# Patient Record
(demographics unavailable — no encounter records)

---

## 2024-10-28 NOTE — HISTORY OF PRESENT ILLNESS
[FreeTextEntry1] : Follow Up [de-identified] : Patient presents today for a follow up. Feels well today  She follows with her urologist regularly for hx of bladder cancer s/p cystoscopy in April.  Follows with her cardiologist, Dr. Lopez, for hx of abnormal EKG, Has had stress test and TTE and per pt were normal. stress test 8/30/23 - normal. Follows with him every 6 months.  Has hx of DM. States her FBG is usually 140's. Follows with optho and podiatry. Has established care with endocrinology   States her BP at home is usually 120's/70's, but she states she gets nervous in the doctor's office.

## 2024-10-28 NOTE — PLAN
[FreeTextEntry1] : 1. DM - A1c 6.9 -->8 -->8.7 -->7.1 --> 8.8 - glyburide-metformin 2.5-500mg BID was dc'ed to avoid BG lows -- dc'ed 1/2/24 - c/w metformin 1000mg BID  - Jardiance 25mg was dc'ed 2/2 unintentional weight loss and pt was instead started on pioglitazone by endocrinology, but pt could not tolerate it 2/2 dizziness and instead was changed to repaglinide  - on statin and ACE-i - CMP ordered - c/w low carb diet - follows with ophtho and podiatry - Follows with endocrinology   2. HTN - chronic, stable. BP initially elevated 2/2 white coat hypertension - on amlodipine-benazepril - f/u CMP  4. HLD - on atorvastatin 20mg - repeat lipid panel when pt is fasting - Follows with her cardiologist, Dr. Lopez, for hx of abnormal EKG, Has had stress test and TTE and per pt were normal. stress test 8/30/23 - normal. Follows with him every 6 months.  5. Hx of Bladder Cancer - s/p TURBT in 2015 -- consistent with noninvasive papillary carcinoma, low grade - undergoes surveillance cystoscopies - Management per her urologist, Dr. Lucas.  6. Weight Loss - 2/2 Jardiance -- resolved after medication change   f/u for CPE

## 2025-01-06 NOTE — PHYSICAL EXAM
[Alert] : alert [Well Nourished] : well nourished [No Acute Distress] : no acute distress [EOMI] : extra ocular movement intact [No Proptosis] : no proptosis [Supple] : the neck was supple [Thyroid Not Enlarged] : the thyroid was not enlarged [No Thyroid Nodules] : no palpable thyroid nodules [No Respiratory Distress] : no respiratory distress [No Accessory Muscle Use] : no accessory muscle use [Normal Rate and Effort] : normal respiratory rate and effort [Normal Rate] : heart rate was normal [Regular Rhythm] : with a regular rhythm [No Edema] : no peripheral edema [Not Tender] : non-tender [Soft] : abdomen soft [No Spinal Tenderness] : no spinal tenderness [Spine Straight] : spine straight [Cranial Nerves Intact] : cranial nerves 2-12 were intact [No Motor Deficits] : the motor exam was normal [Oriented x3] : oriented to person, place, and time [Normal Affect] : the affect was normal [Normal Mood] : the mood was normal [Kyphosis] : no kyphosis present

## 2025-01-06 NOTE — HISTORY OF PRESENT ILLNESS
[FreeTextEntry1] : Pt is a 73 y/o female with colon cancer 2006 in remission and bladder cancer 2015 in remission here with DM2.  DM diagnosis: 2009 Last A1c: 8.7% Previous endocrinologist: Dr. Echeverria x2 visits but switched due to location Home DM meds: Was originally on glyburide and metformin until last year. Then PCP switched meds to metformin and jardiance. Jardiance 25mg daily caused a lot of weight loss. Now on prandin 0.5mg twice daily before meals, and metformin 1g daily. Has been on this regimen since July 2024. SMBG: BG today 197. Not checking daily. Checks at various times, rarely fasting. BG 130s-150s. Symptoms: No N/V/D. Diet at home: Eats twice per day. Eats rice once per meal and  bread once per meal in the morning. Also eating fruits. Microvascular complications: No CKD, UTD with ophtho, no retinopathy, no neuropathy Macrovascular complications: No CVA, MI, PAD ACEi/ARB: amlodipine-benazepril 10-40mg daily Statin: atorvastatin 20mg daily PMHx: As above. No hx of HF, pancreatitis, gallbladder disease, UTIs/yeast infections. No amputations or wounds. No DKA. FHx: Sister with DM. No thyroid cancer. No bladder cancer in family though patient has hx. SHx: No tobacco or etoh use.

## 2025-01-06 NOTE — ASSESSMENT
[FreeTextEntry1] : Pt is a 73 y/o female with colon cancer 2006 in remission and bladder cancer 2015 in remission here with DM2.  Poorly controlled T2DM DM diagnosis: 2009 Last A1c: 8.7% Previous endocrinologist: Dr. Echeverria x2 visits but switched due to location Home DM meds: Was originally on glyburide and metformin until last year. Then PCP switched meds to metformin and jardiance. Jardiance 25mg daily caused a lot of weight loss. Now on prandin 0.5mg twice daily before meals, and metformin 1g daily. Has been on this regimen since July 2024. SMBG: BG today 197. Not checking daily. Checks at various times, rarely fasting. BG 130s-150s. Symptoms: No N/V/D. -Increase metformin to 1g BID -Continue repaglinide 0.5mg twice daily before meals -Declines switch back to Jardiance given previous weight loss -Patient to check FSBG once daily in staggered manner and bring logs to next visit (declines checking more than once daily) -Labs before next visit -Obtain urine albumin-to-creatinine ratio annually to screen for moderately increased albuminuria -Screen for Vitamin B12 deficiency annually as on Metformin -UTD ophthalmologist for dilated eye exam -Referral to nutritionist offered and accepted -Fib4 1.26, advanced fibrosis excluded -Declines charissa pro  HTN -Goal BP <130/80. Continue amlodipine-benazepril 10-40mg daily   HLD -Check lipid profile, ApoB, and LPa -Continue atorvastatin 20mg daily  Patient's condition is high risk with use of medication that requires close monitoring (repaglinide; can cause hypoglycemia and requires regular monitoring).   Mickey Douglas DO

## 2025-02-04 NOTE — HISTORY OF PRESENT ILLNESS
[FreeTextEntry1] : Follow Up [de-identified] : Patient presents today for a follow up. Feels well today  She follows with her urologist regularly for hx of bladder cancer s/p cystoscopy in April.  Follows with her cardiologist, Dr. Lopez, for hx of abnormal EKG, Has had stress test and TTE and per pt were normal. stress test 8/30/23 - normal. Follows with him every 6 months.  Has hx of DM. Follows with optho and podiatry. Has established care with endocrinology   States her BP at home is usually 120's-130's/70's, but she states she gets nervous in the doctor's office.

## 2025-02-04 NOTE — PLAN
[FreeTextEntry1] : 1. DM - A1c 8.7 - glyburide-metformin 2.5-500mg BID was dc'ed to avoid BG lows -- dc'ed 1/2/24 - c/w metformin 1000mg BID  - Jardiance 25mg was dc'ed 2/2 unintentional weight loss and pt was instead started on pioglitazone by endocrinology, but pt could not tolerate it 2/2 dizziness and instead was changed to repaglinide by endocrinology - on statin and ACE-i - CMP ordered - c/w low carb diet - follows with ophtho and podiatry - Follows with endocrinology   2. HTN - chronic, stable. BP initially elevated 2/2 white coat hypertension - on amlodipine-benazepril - f/u CMP  4. HLD - on atorvastatin 20mg - repeat lipid panel ordered - Follows with her cardiologist, Dr. Lopez, for hx of abnormal EKG, Has had stress test and TTE and per pt were normal. stress test 8/30/23 - normal. Follows with him every 6 months.  5. Hx of Bladder Cancer - s/p TURBT in 2015 -- consistent with noninvasive papillary carcinoma, low grade - undergoes surveillance cystoscopies - Management per her urologist, Dr. Lucas.  6. Breast Cancer Screening - referral for mammogram provided   f/u for CPE

## 2025-04-07 NOTE — ASSESSMENT
[FreeTextEntry1] : Pt is a 73 y/o female with colon cancer 2006 in remission and bladder cancer 2015 in remission here with DM2.  Poorly controlled T2DM DM diagnosis: 2009 Last A1c: 8.7% Previous endocrinologist: Dr. Echeverria x2 visits but switched due to location Home DM meds: Was originally on glyburide and metformin until last year. Then PCP switched meds to metformin and jardiance. Jardiance 25mg daily caused a lot of weight loss. Now on prandin 0.5mg twice daily before meals, and metformin 1g daily. Has been on this regimen since July 2024. SMBG: BG today 197. Not checking daily. Checks at various times, rarely fasting. BG 130s-150s. Symptoms: No N/V/D. -Continue metformin to 1g BID -Continue repaglinide 0.5mg twice daily before meals -Declines switch back to Jardiance given previous weight loss -Patient to check FSBG once daily in staggered manner and bring logs to next visit (declines checking more than once daily) -Obtain urine albumin-to-creatinine ratio annually to screen for moderately increased albuminuria -Screen for Vitamin B12 deficiency annually as on Metformin -[facility] ophthalmologist for dilated eye exam -Referral to nutritionist offered and accepted -Fib4 1.26, advanced fibrosis excluded -Declines charissa pro today because going to [place] for 1 month but open to it for next time.  HTN -Goal BP <130/80. Continue amlodipine-benazepril 10-40mg daily  HLD -Work on diet to get cholesterol a bit lower -Continue atorvastatin 20mg daily  Mickey Douglas, DO

## 2025-04-07 NOTE — HISTORY OF PRESENT ILLNESS
[FreeTextEntry1] : Pt is a 73 y/o female with colon cancer 2006 in remission and bladder cancer 2015 in remission here with DM2.  DM diagnosis: 2009 Last A1c: 8.7% Previous endocrinologist: Dr. Echeverria x2 visits but switched due to location Home DM meds: Was originally on glyburide and metformin until last year. Then PCP switched meds to metformin and jardiance. Jardiance 25mg daily caused a lot of weight loss. Now on prandin 0.5mg twice daily before meals, and metformin 1g twice daily. Gets occasional diarrhea but overall it's tolerable. SMBG: BG yesterday 145 before breakfast. Not checking often. Symptoms: No N/V or constipation. Gets occasional diarrhea as above. Diet at home: Eats twice per day. Eats rice once per meal and [place] bread once per meal in the morning. Also eating fruits. Microvascular complications: No CKD, UTD with ophtho, no retinopathy, no neuropathy Macrovascular complications: No CVA, MI, PAD ACEi/ARB: amlodipine-benazepril 10-40mg daily Statin: atorvastatin 20mg daily PMHx: As above. No hx of HF, pancreatitis, gallbladder disease, UTIs/yeast infections. No amputations or wounds. No DKA. FHx: Sister with DM. No thyroid cancer. No bladder cancer in family though patient has hx. SHx: No tobacco or etoh use.

## 2025-05-27 NOTE — HISTORY OF PRESENT ILLNESS
[FreeTextEntry1] : Very pleasant 72-year-old woman who presents for follow-up of history of bladder cancer.  She feels well.  She denies hematuria.  No dysuria.  No flank pain or suprapubic pain.  She underwent a cystoscopy today which demonstrated no urothelial lesions but did demonstrate scars from prior resections.  No other complaints.  No problems over the last year.

## 2025-05-27 NOTE — ASSESSMENT
[FreeTextEntry1] : Very pleasant 71-year-old woman who presents for follow-up of history of bladder cancer -Cystoscopy today demonstrates no urothelial lesions -WBC 9.43 -Creatinine 0.59 -A1c 7.9% -Urinalysis -Urine culture -Urine cytology -Follow-up in 1 year with cystoscopy if urine studies are unremarkable   I have spent 22 minutes on this encounter, exclusive of separately billed services

## 2025-07-23 NOTE — HISTORY OF PRESENT ILLNESS
[FreeTextEntry1] : Pt is a 71 y/o female with colon cancer 2006 in remission and bladder cancer 2015 in remission here with DM2.  DM diagnosis: 2009 Last A1c: 8.7%-->7.9%-->8.0% Previous endocrinologist: Dr. Echeverria x2 visits but switched due to location Home DM meds: Was originally on glyburide and metformin until last year. Then PCP switched meds to metformin and jardiance. Jardiance 25mg daily caused a lot of weight loss. Now on prandin 0.5mg twice daily before meals, and metformin 1g twice daily. Gets occasional diarrhea but overall it's tolerable. SMBG:  fasting today. Not checking at home. Placing Dexcom to try for 10 days. Symptoms: No N/V or constipation. Gets occasional diarrhea as above. Diet at home: Eats twice per day. Eats rice once per meal and  bread once per meal in the morning. Also eating fruits. Microvascular complications: No CKD, UTD with ophtho, no retinopathy, no neuropathy Macrovascular complications: No CVA, MI, PAD ACEi/ARB: amlodipine-benazepril 10-40mg daily Statin: atorvastatin 20mg daily PMHx: As above. No hx of HF, pancreatitis, gallbladder disease, UTIs/yeast infections. No amputations or wounds. No DKA. FHx: Sister with DM. No thyroid cancer. No bladder cancer in family though patient has hx. SHx: No tobacco or etoh use.

## 2025-07-23 NOTE — ASSESSMENT
[FreeTextEntry1] : Pt is a 73 y/o female with colon cancer 2006 in remission and bladder cancer 2015 in remission here with DM2.  Poorly controlled T2DM DM diagnosis: 2009 Last A1c: 8.7%-->7.9%-->8.0% Previous endocrinologist: Dr. Echeverria x2 visits but switched due to location Home DM meds: Was originally on glyburide and metformin until last year. Then PCP switched meds to metformin and jardiance. Jardiance 25mg daily caused a lot of weight loss. Now on prandin 0.5mg twice daily before meals, and metformin 1g twice daily. Gets occasional diarrhea but overall it's tolerable. SMBG:  fasting today. Not checking at home. -Continue metformin to 1g BID -Continue repaglinide but increase to 1mg twice daily before large meals. Can take 0.5mg before meals for smaller meals. Discussed risk of hypoglycemia and importance of checking BG. Patient states she is sensitive to hypoglycemia so will know if her BG is low. Declines DPP-IV. Other alternative agents for BG lowering not ideal for patient so this is best regimen for now.  -Declines switch back to Jardiance given previous weight loss -Patient to check FSBG once daily in staggered manner and bring logs to next visit (declines checking more than once daily) -Obtain urine albumin-to-creatinine ratio annually to screen for moderately increased albuminuria -Screen for Vitamin B12 deficiency annually as on Metformin -UTD ophthalmologist for dilated eye exam -Referral to nutritionist offered and accepted -Fib4 1.26, advanced fibrosis excluded -Placing dexcom G7 sample today. Discussed that there is some variability between Dexcom and FSBG readings (~20%) and to confirm any high or low BG value with FSBG or if feeling unwell. Also discussed that the Dexcom readings are measuring interstitial fluid, not blood, so readings are delayed ~15 minutes. -Dexcom won't be covered so will just use this for educational purposes and diagnostic purposes.  HTN -Goal BP <130/80. Continue amlodipine-benazepril 10-40mg daily  HLD -Continue atorvastatin 20mg daily  RTC 3 months.  Mickey Douglas, DO